# Patient Record
Sex: MALE | Race: WHITE
[De-identification: names, ages, dates, MRNs, and addresses within clinical notes are randomized per-mention and may not be internally consistent; named-entity substitution may affect disease eponyms.]

---

## 2023-10-23 ENCOUNTER — HOSPITAL ENCOUNTER (INPATIENT)
Dept: HOSPITAL 47 - EC | Age: 48
LOS: 2 days | Discharge: HOME | DRG: 897 | End: 2023-10-25
Attending: HOSPITALIST | Admitting: HOSPITALIST
Payer: COMMERCIAL

## 2023-10-23 DIAGNOSIS — F11.10: ICD-10-CM

## 2023-10-23 DIAGNOSIS — F10.239: Primary | ICD-10-CM

## 2023-10-23 DIAGNOSIS — R45.1: ICD-10-CM

## 2023-10-23 DIAGNOSIS — D75.89: ICD-10-CM

## 2023-10-23 DIAGNOSIS — F10.229: ICD-10-CM

## 2023-10-23 DIAGNOSIS — R74.01: ICD-10-CM
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
## 2023-10-23 DIAGNOSIS — Z28.310: ICD-10-CM

## 2023-10-23 DIAGNOSIS — F17.200: ICD-10-CM

## 2023-10-23 DIAGNOSIS — K70.10: ICD-10-CM

## 2023-10-23 LAB
ALBUMIN SERPL-MCNC: 4.4 G/DL (ref 3.5–5)
ALP SERPL-CCNC: 101 U/L (ref 38–126)
ALT SERPL-CCNC: 71 U/L (ref 4–49)
ANION GAP SERPL CALC-SCNC: 13 MMOL/L
AST SERPL-CCNC: 166 U/L (ref 17–59)
BASOPHILS # BLD AUTO: 0 K/UL (ref 0–0.2)
BASOPHILS NFR BLD AUTO: 0 %
BUN SERPL-SCNC: 11 MG/DL (ref 9–20)
CALCIUM SPEC-MCNC: 8.5 MG/DL (ref 8.4–10.2)
CHLORIDE SERPL-SCNC: 104 MMOL/L (ref 98–107)
CO2 SERPL-SCNC: 24 MMOL/L (ref 22–30)
EOSINOPHIL # BLD AUTO: 0.4 K/UL (ref 0–0.7)
EOSINOPHIL NFR BLD AUTO: 5 %
ERYTHROCYTE [DISTWIDTH] IN BLOOD BY AUTOMATED COUNT: 4.26 M/UL (ref 4.3–5.9)
ERYTHROCYTE [DISTWIDTH] IN BLOOD: 12.8 % (ref 11.5–15.5)
GLUCOSE SERPL-MCNC: 88 MG/DL (ref 74–99)
HCT VFR BLD AUTO: 42.9 % (ref 39–53)
HGB BLD-MCNC: 14.1 GM/DL (ref 13–17.5)
LYMPHOCYTES # SPEC AUTO: 2 K/UL (ref 1–4.8)
LYMPHOCYTES NFR SPEC AUTO: 27 %
MAGNESIUM SPEC-SCNC: 2.1 MG/DL (ref 1.6–2.3)
MCH RBC QN AUTO: 33 PG (ref 25–35)
MCHC RBC AUTO-ENTMCNC: 32.7 G/DL (ref 31–37)
MCV RBC AUTO: 100.7 FL (ref 80–100)
MONOCYTES # BLD AUTO: 0.3 K/UL (ref 0–1)
MONOCYTES NFR BLD AUTO: 4 %
NEUTROPHILS # BLD AUTO: 4.7 K/UL (ref 1.3–7.7)
NEUTROPHILS NFR BLD AUTO: 62 %
PLATELET # BLD AUTO: 195 K/UL (ref 150–450)
POTASSIUM SERPL-SCNC: 4 MMOL/L (ref 3.5–5.1)
PROT SERPL-MCNC: 7.1 G/DL (ref 6.3–8.2)
SODIUM SERPL-SCNC: 141 MMOL/L (ref 137–145)
WBC # BLD AUTO: 7.6 K/UL (ref 3.8–10.6)

## 2023-10-23 PROCEDURE — 82747 ASSAY OF FOLIC ACID RBC: CPT

## 2023-10-23 PROCEDURE — 85025 COMPLETE CBC W/AUTO DIFF WBC: CPT

## 2023-10-23 PROCEDURE — 96372 THER/PROPH/DIAG INJ SC/IM: CPT

## 2023-10-23 PROCEDURE — 80053 COMPREHEN METABOLIC PANEL: CPT

## 2023-10-23 PROCEDURE — 93005 ELECTROCARDIOGRAM TRACING: CPT

## 2023-10-23 PROCEDURE — 80320 DRUG SCREEN QUANTALCOHOLS: CPT

## 2023-10-23 PROCEDURE — 99285 EMERGENCY DEPT VISIT HI MDM: CPT

## 2023-10-23 PROCEDURE — 83605 ASSAY OF LACTIC ACID: CPT

## 2023-10-23 PROCEDURE — 80306 DRUG TEST PRSMV INSTRMNT: CPT

## 2023-10-23 PROCEDURE — 36415 COLL VENOUS BLD VENIPUNCTURE: CPT

## 2023-10-23 PROCEDURE — 82607 VITAMIN B-12: CPT

## 2023-10-23 PROCEDURE — 83735 ASSAY OF MAGNESIUM: CPT

## 2023-10-23 NOTE — ED
General Adult HPI





- General


Chief complaint: Alcohol


Stated complaint: Mental Health


Time Seen by Provider: 10/23/23 21:59


Source: patient


Mode of arrival: ambulatory


Limitations: no limitations





- History of Present Illness


Initial comments: 


Dictation was produced using dragon dictation software. please excuse any 

grammatical, word or spelling errors. 











Chief Complaint: 48-year-old male presents emergency department for alcohol 

treatment





History of Present Illness: 48-year-old female he is brought by his close family

members.  States that is here seeking treatment for alcohol dependence.  Patient

drinks 4 L of hard liquor daily.  So is addicted to heroin.  Denies any pain 

complaints.  Patient states he was drinking earlier today.  Denies any abdominal

pain.  He has tried to self treat his alcoholism however he became significantly

ill and resorted back to drinking.








The ROS documented in this emergency department record has been reviewed and 

confirmed by me.  Those systems with pertinent positive or negative responses ha

ve been documented in the HPI.  All other systems are other negative and/or 

noncontributory.

















- Related Data


                                    Allergies











Allergy/AdvReac Type Severity Reaction Status Date / Time


 


No Known Allergies Allergy   Verified 10/23/23 22:00














Review of Systems


ROS Statement: 


Those systems with pertinent positive or pertinent negative responses have been 

documented in the HPI.





ROS Other: All systems not noted in ROS Statement are negative.





Past Medical History


Smoking Status: Current every day smoker


Past Alcohol Use History: Abuse


Past Drug Use History: Heroin





General Exam





- General Exam Comments


Initial Comments: 








PHYSICAL EXAM:


General Impression: Alert and oriented x3, not in acute distress, inebriated


HEENT: Normocephalic atraumatic, extra-ocular movements intact, pupils equal and

reactive to light bilaterally, mucous membranes moist.


Cardiovascular: Heart regular rate and rhythm


Chest: Able to complete full sentences, no retractions, no tachypnea


Abdomen: abdomen soft, non-tender, non-distended, no organomegaly


Musculoskeletal: Pulses present and equal in all extremities, no peripheral 

edema


Motor:  no focal deficits noted


Neurological: CN II-XII grossly intact, no focal motor or sensory deficits noted


Skin: Intact with no visualized rashes


Psych: Normal affect and mood











Limitations: no limitations





Course


                                   Vital Signs











  10/23/23 10/23/23





  21:55 23:00


 


Temperature 98.5 F 


 


Pulse Rate 114 H 95


 


Respiratory 20 16





Rate  


 


Blood Pressure 151/108 132/99


 


O2 Sat by Pulse 96 94 L





Oximetry  














Medical Decision Making





- Medical Decision Making


Was pt. sent in by a medical professional or institution (ROBER Pinto, NP, urgent 

care, hospital, or nursing home...) When possible be specific


@  -No


Did you speak to anyone other than the patient for history (EMS, parent, family,

police, friend...)? What history was obtained from this source 


@  -No


Did you review nursing and triage notes (agree or disagree)?  Why? 


@  -I reviewed and agree with nursing and triage notes


Were old charts reviewed (outside hosp., previous admission, EMS record, old 

EKG, old radiological studies, urgent care reports/EKG's, nursing home records)?

Report findings 


@  -No old charts were reviewed


Differential Diagnosis (chest pain, altered mental status, abdominal pain women,

abdominal pain men, vaginal bleeding, musculoskeletal, weakness, fever, dyspnea,

syncope, headache, dizziness, GI bleed, back pain, seizure, CVA, palpatations, 

mental health)? 


@  -not applicable


EKG interpreted by me (3pts min.).


@  -None done


X-rays interpreted by me (1pt min.).


@  -None done


CT interpreted by me (1pt min.).


@  -None done


U/S interpreted by me (1pt. min.).


@  -None done


What testing was considered but not performed or refused? (CT, X-rays, U/S, 

labs)? Why?


@  -None


What meds were considered but not given or refused? Why?


@  -None


Did you discuss the management of the patient with other professionals 

(professionals i.e. ROBER Pinto, NP, lab, RT, psych nurse, , , 

teacher, , )? Give summary


@  -No


Was smoking cessation discussed for >3mins.?


@  -No


Was critical care preformed (if so, how long)?


@  -No


Were there social determinants of health that impacted care today? How? 

(Homelessness, low income, unemployed, alcoholism, drug addiction, 

transportation, low edu. Level, literacy, decrease access to med. care, longterm, 

rehab)?


@  -No


Was there de-escalation of care discussed even if they declined (Discuss DNR or 

withdrawal of care, Hospice)? DNR status


@  -No


What co-morbidities impacted this encounter? (DM, HTN, Smoking, COPD, CAD, 

Cancer, CVA, ARF, Chemo, Hep., AIDS, mental health diagnosis, sleep apnea, 

morbid obesity)?


@  -None


Was patient admitted / discharged? Hospital course, mention meds given and 

route, prescriptions, significant lab abnormalities, going to OR and other pert

inent info.


@  -48-year-old alcohol dependent male presents to the emergency department 

seeking treatment for alcohol dependence.  Vital signs stable.  Laboratory 

evaluation obtained.  Alcohol level of 377.  Patient does have some mild 

alcoholic hepatitis.  Vital signs stable.  Patient be admitted for alcohol 

withdrawal monitoring and treatment.


Undiagnosed new problem with uncertain prognosis?


@  -No


Drug Therapy requiring intensive monitoring for toxicity (Heparin, Nitro, 

Insulin, Cardizem)?


@  -No


Were any procedures done?


@  -No


Diagnosis/symptom?  Acute, or Chronic, or Acute on Chronic?  Uncomplicated 

(without systemic symptoms) or Complicated (systemic symptoms)?


@  -Alcohol dependence


Side effects of treatment?


@  -No


Exacerbation, Progression, or Severe Exacerbation?


@  -No


Poses a threat to life or bodily function? How? (Chest pain, USA, MI, pneumonia,

PE, COPD, DKA, ARF, appy, cholecystitis, CVA, Diverticulitis, Homicidal, 

Suicidal, threat to staff... and all critical care pts)


@  -yes








- Lab Data


Result diagrams: 


                                 10/23/23 22:40





                                 10/23/23 22:40


                                   Lab Results











  10/23/23 10/23/23 10/23/23 Range/Units





  22:40 22:40 22:40 


 


WBC  7.6    (3.8-10.6)  k/uL


 


RBC  4.26 L    (4.30-5.90)  m/uL


 


Hgb  14.1    (13.0-17.5)  gm/dL


 


Hct  42.9    (39.0-53.0)  %


 


MCV  100.7 H    (80.0-100.0)  fL


 


MCH  33.0    (25.0-35.0)  pg


 


MCHC  32.7    (31.0-37.0)  g/dL


 


RDW  12.8    (11.5-15.5)  %


 


Plt Count  195    (150-450)  k/uL


 


MPV  8.0    


 


Neutrophils %  62    %


 


Lymphocytes %  27    %


 


Monocytes %  4    %


 


Eosinophils %  5    %


 


Basophils %  0    %


 


Neutrophils #  4.7    (1.3-7.7)  k/uL


 


Lymphocytes #  2.0    (1.0-4.8)  k/uL


 


Monocytes #  0.3    (0-1.0)  k/uL


 


Eosinophils #  0.4    (0-0.7)  k/uL


 


Basophils #  0.0    (0-0.2)  k/uL


 


Sodium   141   (137-145)  mmol/L


 


Potassium   4.0   (3.5-5.1)  mmol/L


 


Chloride   104   ()  mmol/L


 


Carbon Dioxide   24   (22-30)  mmol/L


 


Anion Gap   13   mmol/L


 


BUN   11   (9-20)  mg/dL


 


Creatinine   0.68   (0.66-1.25)  mg/dL


 


Est GFR (CKD-EPI)AfAm   >90   (>60 ml/min/1.73 sqM)  


 


Est GFR (CKD-EPI)NonAf   >90   (>60 ml/min/1.73 sqM)  


 


Glucose   88   (74-99)  mg/dL


 


Plasma Lactic Acid Jamarcus    1.9  (0.7-2.0)  mmol/L


 


Calcium   8.5   (8.4-10.2)  mg/dL


 


Magnesium   2.1   (1.6-2.3)  mg/dL


 


Total Bilirubin   0.6   (0.2-1.3)  mg/dL


 


AST   166 H   (17-59)  U/L


 


ALT   71 H   (4-49)  U/L


 


Alkaline Phosphatase   101   ()  U/L


 


Total Protein   7.1   (6.3-8.2)  g/dL


 


Albumin   4.4   (3.5-5.0)  g/dL


 


Urine Opiates Screen     (NotDetected)  


 


Ur Oxycodone Screen     (NotDetected)  


 


Urine Methadone Screen     (NotDetected)  


 


Ur Propoxyphene Screen     (NotDetected)  


 


Ur Barbiturates Screen     (NotDetected)  


 


U Tricyclic Antidepress     (NotDetected)  


 


Ur Phencyclidine Scrn     (NotDetected)  


 


Ur Amphetamines Screen     (NotDetected)  


 


U Methamphetamines Scrn     (NotDetected)  


 


U Benzodiazepines Scrn     (NotDetected)  


 


Urine Cocaine Screen     (NotDetected)  


 


U Marijuana (THC) Screen     (NotDetected)  


 


Serum Alcohol   377 H*   mg/dL














  10/23/23 Range/Units





  22:40 


 


WBC   (3.8-10.6)  k/uL


 


RBC   (4.30-5.90)  m/uL


 


Hgb   (13.0-17.5)  gm/dL


 


Hct   (39.0-53.0)  %


 


MCV   (80.0-100.0)  fL


 


MCH   (25.0-35.0)  pg


 


MCHC   (31.0-37.0)  g/dL


 


RDW   (11.5-15.5)  %


 


Plt Count   (150-450)  k/uL


 


MPV   


 


Neutrophils %   %


 


Lymphocytes %   %


 


Monocytes %   %


 


Eosinophils %   %


 


Basophils %   %


 


Neutrophils #   (1.3-7.7)  k/uL


 


Lymphocytes #   (1.0-4.8)  k/uL


 


Monocytes #   (0-1.0)  k/uL


 


Eosinophils #   (0-0.7)  k/uL


 


Basophils #   (0-0.2)  k/uL


 


Sodium   (137-145)  mmol/L


 


Potassium   (3.5-5.1)  mmol/L


 


Chloride   ()  mmol/L


 


Carbon Dioxide   (22-30)  mmol/L


 


Anion Gap   mmol/L


 


BUN   (9-20)  mg/dL


 


Creatinine   (0.66-1.25)  mg/dL


 


Est GFR (CKD-EPI)AfAm   (>60 ml/min/1.73 sqM)  


 


Est GFR (CKD-EPI)NonAf   (>60 ml/min/1.73 sqM)  


 


Glucose   (74-99)  mg/dL


 


Plasma Lactic Acid Jamarcus   (0.7-2.0)  mmol/L


 


Calcium   (8.4-10.2)  mg/dL


 


Magnesium   (1.6-2.3)  mg/dL


 


Total Bilirubin   (0.2-1.3)  mg/dL


 


AST   (17-59)  U/L


 


ALT   (4-49)  U/L


 


Alkaline Phosphatase   ()  U/L


 


Total Protein   (6.3-8.2)  g/dL


 


Albumin   (3.5-5.0)  g/dL


 


Urine Opiates Screen  Detected H  (NotDetected)  


 


Ur Oxycodone Screen  Not Detected  (NotDetected)  


 


Urine Methadone Screen  Not Detected  (NotDetected)  


 


Ur Propoxyphene Screen  Not Detected  (NotDetected)  


 


Ur Barbiturates Screen  Not Detected  (NotDetected)  


 


U Tricyclic Antidepress  Not Detected  (NotDetected)  


 


Ur Phencyclidine Scrn  Not Detected  (NotDetected)  


 


Ur Amphetamines Screen  Not Detected  (NotDetected)  


 


U Methamphetamines Scrn  Not Detected  (NotDetected)  


 


U Benzodiazepines Scrn  Not Detected  (NotDetected)  


 


Urine Cocaine Screen  Not Detected  (NotDetected)  


 


U Marijuana (THC) Screen  Detected H  (NotDetected)  


 


Serum Alcohol   mg/dL














Disposition


Clinical Impression: 


 Alcoholic intoxication





Disposition: ADMITTED AS IP TO THIS \[facility]\""


Condition: Fair


Referrals: 


None,Stated [Primary Care Provider] - 1-2 days


Decision Time: 01:10

## 2023-10-24 RX ADMIN — FOLIC ACID SCH MG: 1 TABLET ORAL at 17:06

## 2023-10-24 RX ADMIN — Medication SCH MG: at 07:49

## 2023-10-24 RX ADMIN — CEFAZOLIN SCH MLS/HR: 330 INJECTION, POWDER, FOR SOLUTION INTRAMUSCULAR; INTRAVENOUS at 01:15

## 2023-10-24 RX ADMIN — ENOXAPARIN SODIUM SCH MG: 40 INJECTION SUBCUTANEOUS at 07:49

## 2023-10-24 RX ADMIN — CEFAZOLIN SCH: 330 INJECTION, POWDER, FOR SOLUTION INTRAMUSCULAR; INTRAVENOUS at 17:28

## 2023-10-24 NOTE — P.HPIM
History of Present Illness


H&P Date: 10/24/23





Patient is a 48-year-old male with a PMH of polysubstance abuse and alcohol 

abuse who presents to the emergency room with alcohol intoxication, seeking help

with quitting.  Patient reports he has been drinking 2 pints of whiskey daily 

for the past 2 years.  Also reports daily use of heroin during this time.  

Denies history of alcohol withdrawal including delirium tremens or seizures.  

Does report a history of severe heroin withdrawals.  Reports last drink was just

prior to coming to the emergency room.  Denied experiencing chest discomfort, 

shortness of breath, nausea, vomiting, abdominal pain, diarrhea.





EKG in the emergency room revealed sinus rhythm at 90 bpm with no ST/T-wave 

changes noted as reviewed by me.  Laboratory evaluation was remarkable for serum

alcohol level 337, , ALT 71, .7, and urine toxicology positive for

opiates and marijuana.





ED documentation reviewed and case discussed with ED provider. 





Review of systems:


Pertinent positives and negatives as discussed in HPI, a complete review of 

systems was performed and all other systems are negative.





Physical examination:


Vital signs reviewed


General: non toxic, no distress, foul smelling, appears at stated age, normal 

weight, appears intoxicated


Derm: no unusual rashes/lesions, warm


Head: atraumatic, normocephalic, symmetric


Eyes: EOMI, no lid lag, anicteric sclera, pupils equal round reactive to light


ENT: Nose and ears atraumatic


Neck: No cervical lymphadenopathy, trachea midline, supple


Mouth: no lip lesion, mucus membranes moist


Cardiovascular: S1S2 reg, no murmur, positive dorsalis pedis pulse bilateral, no

edema


Lungs: CTA bilateral, no rhonchi, no rales, no accessory muscle use


Abdominal: soft,  nontender to palpation, no guarding


Ext: muscle strength 5 out of 5 in all 4 extremities grossly, no gross muscle 

atrophy, no contractures, 


Neuro:  CN II-XI grossly intact, no gross focal neuro deficits


Psych: Somewhat slow to respond, oriented, appropriate affect 





Assessment:





Alcohol intoxication, impending withdrawal


Heroin abuse


Macrocytosis





Imaging:


EKG in the emergency room revealed sinus rhythm at 90 bpm with no ST/T-wave 

changes noted as reviewed by me.





Data Review:


Laboratory evaluation was remarkable for serum alcohol level 337, , ALT 

71, .7, and urine toxicology positive for opiates and marijuana.





Plan:


C/w CIWA protocol with Ativan IV prn


Continue Thiamine 100 mg po qd 


Fall, aspiration, seizure precautions


Strongly advised on importance of cessation


Check B12 and folate levels





DVT prophylaxis: Lovenox Subq





The patient is admitted with an anticipated greater than 2 midnight stay for 

evaluation of EtOH and opiate abuse


CODE STATUS: Full Code


Discussed with: Patient


Anticipated discharge place: Home





Past Medical History


Smoking Status: Current every day smoker


Past Alcohol Use History: Abuse


Past Drug Use History: Heroin





Medications and Allergies


                                    Allergies











Allergy/AdvReac Type Severity Reaction Status Date / Time


 


No Known Allergies Allergy   Verified 10/23/23 22:00














Physical Exam


Vitals: 


                                   Vital Signs











  Temp Pulse Resp BP Pulse Ox


 


 10/24/23 01:14   93  16  98/71  96


 


 10/23/23 23:00   95  16  132/99  94 L


 


 10/23/23 21:55  98.5 F  114 H  20  151/108  96








                                Intake and Output











 10/23/23 10/23/23 10/24/23





 14:59 22:59 06:59


 


Other:   


 


  Weight  70.76 kg 














Results


CBC & Chem 7: 


                                 10/23/23 22:40





                                 10/23/23 22:40


Labs: 


                  Abnormal Lab Results - Last 24 Hours (Table)











  10/23/23 10/23/23 10/23/23 Range/Units





  22:40 22:40 22:40 


 


RBC  4.26 L    (4.30-5.90)  m/uL


 


MCV  100.7 H    (80.0-100.0)  fL


 


AST   166 H   (17-59)  U/L


 


ALT   71 H   (4-49)  U/L


 


Urine Opiates Screen    Detected H  (NotDetected)  


 


U Marijuana (THC) Screen    Detected H  (NotDetected)  


 


Serum Alcohol   377 H*   mg/dL

## 2023-10-25 VITALS — HEART RATE: 61 BPM | DIASTOLIC BLOOD PRESSURE: 86 MMHG | SYSTOLIC BLOOD PRESSURE: 135 MMHG | RESPIRATION RATE: 16 BRPM

## 2023-10-25 VITALS — TEMPERATURE: 97.8 F

## 2023-10-25 LAB
ALBUMIN SERPL-MCNC: 3.7 G/DL (ref 3.5–5)
ALBUMIN/GLOB SERPL: 1.3 {RATIO}
ALP SERPL-CCNC: 110 U/L (ref 38–126)
ALT SERPL-CCNC: 75 U/L (ref 4–49)
ANION GAP SERPL CALC-SCNC: 11 MMOL/L
AST SERPL-CCNC: 132 U/L (ref 17–59)
BUN SERPL-SCNC: 13 MG/DL (ref 9–20)
CALCIUM SPEC-MCNC: 8.6 MG/DL (ref 8.4–10.2)
CHLORIDE SERPL-SCNC: 104 MMOL/L (ref 98–107)
CO2 SERPL-SCNC: 20 MMOL/L (ref 22–30)
GLOBULIN SER CALC-MCNC: 2.8 G/DL
GLUCOSE SERPL-MCNC: 77 MG/DL (ref 74–99)
POTASSIUM SERPL-SCNC: 4.3 MMOL/L (ref 3.5–5.1)
PROT SERPL-MCNC: 6.5 G/DL (ref 6.3–8.2)
SODIUM SERPL-SCNC: 135 MMOL/L (ref 137–145)

## 2023-10-25 RX ADMIN — CEFAZOLIN SCH: 330 INJECTION, POWDER, FOR SOLUTION INTRAMUSCULAR; INTRAVENOUS at 04:34

## 2023-10-25 RX ADMIN — FOLIC ACID SCH MG: 1 TABLET ORAL at 09:57

## 2023-10-25 RX ADMIN — Medication SCH MG: at 09:57

## 2023-10-25 RX ADMIN — ENOXAPARIN SODIUM SCH MG: 40 INJECTION SUBCUTANEOUS at 09:56

## 2023-10-25 NOTE — P.DS
Providers


Date of admission: 


10/24/23 01:07





Expected date of discharge: 10/25/23


Attending physician: 


Clyde Woodall MD





Primary care physician: 


Stated None





Hospital Course: 





Patient is a 48-year-old male with a PMH of polysubstance abuse and alcohol 

abuse who presents to the emergency room with alcohol intoxication, seeking help

with quitting.  Patient reports he has been drinking 2 pints of whiskey daily 

for the past 2 years.  Also reports daily use of heroin during this time.  

Denies history of alcohol withdrawal including delirium tremens or seizures.  

Does report a history of severe heroin withdrawals.  Reports last drink was just

prior to coming to the emergency room.  Denied experiencing chest discomfort, 

shortness of breath, nausea, vomiting, abdominal pain, diarrhea.





EKG in the emergency room revealed sinus rhythm at 90 bpm with no ST/T-wave 

changes noted as reviewed by me.  Laboratory evaluation was remarkable for serum

alcohol level 337, , ALT 71, .7, and urine toxicology positive for

opiates and marijuana.





Patient was started on CIWA protocol and given Ativan as needed. He was also 

started on Libium. 





10/25 Patient was seen and examined. No acute events overnight. He has gotten 1 

mg Ativan overnight. He reports shakiness but symptoms generally better compared

to admission. Patient states he is comfortable with being discharged home. 

Hemodynamically stable. We will avoid prescribing benzodiazepines on discharge 

given his history of heroin abuse. Advised to quit EtOH and heroin. Advised 

mixing both could lead to respiratory depression and sudden death. Patient 

verbalized understanding of the plan.  





Pertient studies include EKG.





General: non toxic, no distress, foul smelling, appears at stated age, normal 

weight, appears intoxicated


Derm: no unusual rashes/lesions, warm


Head: atraumatic, normocephalic, symmetric


Eyes: EOMI, no lid lag, anicteric sclera


ENT: Nose and ears atraumatic


Neck: No cervical lymphadenopathy, trachea midline, supple


Cardiovascular: S1S2 reg, no murmur, no edema


Lungs: CTA bilateral, no rhonchi, no rales, no accessory muscle use


Ext: muscle strength 5 out of 5 in all 4 extremities grossly, no gross muscle 

atrophy, no contractures, 


Neuro:  no gross focal neuro deficits


Psych: oriented, appropriate affect 





Discharge Diagnosis:





Alcohol intoxication, impending withdrawal


Heroin abuse


Macrocytosis


Transaminitis





This complex discharge took 35 minutes to complete. 


Patient Condition at Discharge: Stable





Plan - Discharge Summary


Discharge Rx Participant: Yes


New Discharge Prescriptions: 


New


   Multivitamins, Thera [Multivitamin (formulary)] 1 each PO DAILY #30 tab


   Thiamine [Vitamin B-1] 100 mg PO DAILY #30 tab


   Folic Acid 1 mg PO DAILY #30 tab


Discharge Medication List





Folic Acid 1 mg PO DAILY #30 tab 10/25/23 [Rx]


Multivitamins, Thera [Multivitamin (formulary)] 1 each PO DAILY #30 tab 10/25/23

[Rx]


Thiamine [Vitamin B-1] 100 mg PO DAILY #30 tab 10/25/23 [Rx]








Follow up Appointment(s)/Referral(s): 


None,Stated [Primary Care Provider] - 1-2 days


Patient Instructions/Handouts:  Alcohol Intoxication (DC)


Activity/Diet/Wound Care/Special Instructions: 


DO NOT DRINK ALCOHOL


DO NOT DO HEROIN


DO NOT MIX ALCOHOL AND HEROIN


Discharge/Stand Alone Forms:  [facility], Outpatient Counseling, In 

Substance Abuse Facilities